# Patient Record
Sex: MALE | Race: WHITE | NOT HISPANIC OR LATINO | URBAN - METROPOLITAN AREA
[De-identification: names, ages, dates, MRNs, and addresses within clinical notes are randomized per-mention and may not be internally consistent; named-entity substitution may affect disease eponyms.]

---

## 2019-07-23 ENCOUNTER — EMERGENCY (EMERGENCY)
Facility: HOSPITAL | Age: 24
LOS: 0 days | Discharge: HOME | End: 2019-07-23
Attending: EMERGENCY MEDICINE | Admitting: EMERGENCY MEDICINE
Payer: MEDICAID

## 2019-07-23 VITALS
HEART RATE: 84 BPM | DIASTOLIC BLOOD PRESSURE: 71 MMHG | OXYGEN SATURATION: 100 % | HEIGHT: 70 IN | SYSTOLIC BLOOD PRESSURE: 125 MMHG | RESPIRATION RATE: 20 BRPM | WEIGHT: 149.91 LBS | TEMPERATURE: 99 F

## 2019-07-23 DIAGNOSIS — H91.90 UNSPECIFIED HEARING LOSS, UNSPECIFIED EAR: ICD-10-CM

## 2019-07-23 DIAGNOSIS — R59.0 LOCALIZED ENLARGED LYMPH NODES: ICD-10-CM

## 2019-07-23 DIAGNOSIS — H61.23 IMPACTED CERUMEN, BILATERAL: ICD-10-CM

## 2019-07-23 PROCEDURE — 99283 EMERGENCY DEPT VISIT LOW MDM: CPT

## 2019-07-23 RX ORDER — CARBAMIDE PEROXIDE 81.86 MG/ML
10 SOLUTION/ DROPS AURICULAR (OTIC) ONCE
Refills: 0 | Status: COMPLETED | OUTPATIENT
Start: 2019-07-23 | End: 2019-07-23

## 2019-07-23 RX ORDER — AMOXICILLIN 250 MG/5ML
500 SUSPENSION, RECONSTITUTED, ORAL (ML) ORAL ONCE
Refills: 0 | Status: COMPLETED | OUTPATIENT
Start: 2019-07-23 | End: 2019-07-23

## 2019-07-23 RX ORDER — AMOXICILLIN 250 MG/5ML
875 SUSPENSION, RECONSTITUTED, ORAL (ML) ORAL
Refills: 0 | Status: DISCONTINUED | OUTPATIENT
Start: 2019-07-23 | End: 2019-07-23

## 2019-07-23 RX ORDER — AMOXICILLIN 250 MG/5ML
1 SUSPENSION, RECONSTITUTED, ORAL (ML) ORAL
Qty: 20 | Refills: 0
Start: 2019-07-23 | End: 2019-08-01

## 2019-07-23 RX ADMIN — Medication 500 MILLIGRAM(S): at 21:54

## 2019-07-23 NOTE — ED PROVIDER NOTE - OBJECTIVE STATEMENT
24yo male with no significant PMHx presents c/o R ear pain and hearing loss since 0400. Patient reports he has had cerumen build up in past and attempted to use q-tip to dislodge cerumen without relief. Pain localized to R ear, pt describes as "deep inside," without movement of ear, constant and non-radiating. No fever, chills, sore throat. No recent precipitating exposure to pools or ocean.

## 2019-07-23 NOTE — ED PROVIDER NOTE - PHYSICAL EXAMINATION
CONST: Well appearing in NAD  EYES: PERRL, EOMI, Sclera and conjunctiva clear.  ENT: No nasal discharge. Bilateral TMs obscured with soft cerumen, R post-auricular and R tonsillar lymphadenopathy. Oropharynx normal appearing, no erythema or exudates. Uvula midline.  CARD: Normal S1 S2; Normal rate and rhythm  RESP: Equal BS B/L, No wheezes, rhonchi or rales. No distress  SKIN: Warm, dry, no acute rashes. Good turgor

## 2019-07-23 NOTE — ED PROVIDER NOTE - NS ED ROS FT
CONST: No fever, chills or bodyaches  EYES: No pain, redness, drainage or visual changes.  ENT: see HPI  SKIN: No rashes  NEURO: No headache, dizziness, paresthesias or LOC

## 2019-07-23 NOTE — ED PROVIDER NOTE - ATTENDING CONTRIBUTION TO CARE
24 yo M no pmh presents with right ear pain. States that he felt like he was having ear wax building up in his ears worse on the right. Today he felt like it got blocked, used a q-tip and afterwards started to feel worsening pain in the right ear. no fevers, no headache, no dizziness. no hx of ear infections. no recent swimming.     CONSTITUTIONAL: Well-developed; well-nourished; in no acute distress.   SKIN: warm, dry  HEAD: Normocephalic; atraumatic.  EYES: PERRL, no conjunctival erythema  ENT: bl cerum impaction. + errythematous external canal on right side. no tenderness with auricular movement. + lymphadenopathy. no mastoid tenderness. unable to visualize TM after extensive irrigation.

## 2019-07-23 NOTE — ED PROVIDER NOTE - CLINICAL SUMMARY MEDICAL DECISION MAKING FREE TEXT BOX
Patient presented with right ear pain and cerumen impaction. Right ear irrigated. Found to have erythematous canal. Treated with drops and amoxicillin. Understands to follow up with ent. return precautions discussed.

## 2019-07-23 NOTE — ED PROVIDER NOTE - NSFOLLOWUPINSTRUCTIONS_ED_ALL_ED_FT
CERUMEN IMPACTION - AfterCare(R) Instructions(ER/ED)     Cerumen Impaction    WHAT YOU NEED TO KNOW:    Cerumen impaction is the blockage of the outer ear canal by tightly packed cerumen (earwax). It is generally treated with procedures such as flushing or suctioning the ear canal or the use of instruments to remove the impaction. Ear Anatomy         DISCHARGE INSTRUCTIONS:    Medicines:    Ear drops: These are used to soften the wax in your ear. Wax softening ear drops may be bought without a prescription. Ask your healthcare provider how often you should use this medicine. Read the instructions carefully before you use the ear drops. Do the following when you put in ear drops:   Warm the drops by holding the bottle in your hands for a few minutes. Cold ear drops may make you dizzy.      Lie down with the affected ear toward the ceiling. You may also stand with your head tilted to one side.      Pull your ear lobe up and back, and place the correct number of drops into the ear.      Keep your ear facing up for 5 to 10 minutes so the drops coat the outer ear canal.       Gently clean the outer part of the ear with a cotton swab. Do not place the cotton swab or anything inside your ear canal. This increases the risk of damaging your eardrum.       Take your medicine as directed. Contact your healthcare provider if you think your medicine is not helping or if you have side effects. Tell him of her if you are allergic to any medicine. Keep a list of the medicines, vitamins, and herbs you take. Include the amounts, and when and why you take them. Bring the list or the pill bottles to follow-up visits. Carry your medicine list with you in case of an emergency.    Follow up with your healthcare provider as directed: Write down your questions so you remember to ask them during your visits.     Contact your healthcare provider if:     You have a fever.       You have trouble hearing or ringing in your ear.      You have questions about your condition or care.    Return to the emergency department if:     You feel dizzy.      You have discharge or blood coming out of your ear.      Your ear pain does not go away or gets worse.

## 2019-07-23 NOTE — ED PROVIDER NOTE - CARE PLAN
Principal Discharge DX:	Cerumen impaction  Secondary Diagnosis:	Lymphadenopathy  Secondary Diagnosis:	Otalgia